# Patient Record
Sex: MALE | Race: WHITE | Employment: FULL TIME | URBAN - METROPOLITAN AREA
[De-identification: names, ages, dates, MRNs, and addresses within clinical notes are randomized per-mention and may not be internally consistent; named-entity substitution may affect disease eponyms.]

---
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
## 2019-01-12 ENCOUNTER — HOSPITAL ENCOUNTER (EMERGENCY)
Facility: CLINIC | Age: 19
Discharge: HOME | End: 2019-01-12
Attending: EMERGENCY MEDICINE | Admitting: EMERGENCY MEDICINE

## 2019-01-12 ENCOUNTER — APPOINTMENT (OUTPATIENT)
Dept: GENERAL RADIOLOGY | Facility: CLINIC | Age: 19
End: 2019-01-12

## 2019-01-12 VITALS
OXYGEN SATURATION: 99 % | DIASTOLIC BLOOD PRESSURE: 49 MMHG | TEMPERATURE: 98.6 F | RESPIRATION RATE: 18 BRPM | WEIGHT: 160 LBS | HEART RATE: 88 BPM | HEIGHT: 71 IN | SYSTOLIC BLOOD PRESSURE: 139 MMHG | BODY MASS INDEX: 22.4 KG/M2

## 2019-01-12 DIAGNOSIS — K59.00 CONSTIPATION, UNSPECIFIED CONSTIPATION TYPE: ICD-10-CM

## 2019-01-12 DIAGNOSIS — M94.0 COSTOCHONDRITIS: ICD-10-CM

## 2019-01-12 LAB
ALBUMIN SERPL-MCNC: 3.9 G/DL (ref 3.4–5)
ALBUMIN UR-MCNC: 10 MG/DL
ALP SERPL-CCNC: 96 U/L (ref 65–260)
ALT SERPL W P-5'-P-CCNC: 20 U/L (ref 0–50)
AMORPH CRY #/AREA URNS HPF: ABNORMAL /HPF
ANION GAP SERPL CALCULATED.3IONS-SCNC: 3 MMOL/L (ref 3–14)
APPEARANCE UR: ABNORMAL
AST SERPL W P-5'-P-CCNC: 15 U/L (ref 0–35)
BASOPHILS # BLD AUTO: 0 10E9/L (ref 0–0.2)
BASOPHILS NFR BLD AUTO: 0.5 %
BILIRUB SERPL-MCNC: 0.6 MG/DL (ref 0.2–1.3)
BILIRUB UR QL STRIP: NEGATIVE
BUN SERPL-MCNC: 13 MG/DL (ref 7–30)
CALCIUM SERPL-MCNC: 9.2 MG/DL (ref 8.5–10.1)
CHLORIDE SERPL-SCNC: 107 MMOL/L (ref 98–110)
CO2 SERPL-SCNC: 33 MMOL/L (ref 20–32)
COLOR UR AUTO: YELLOW
CREAT SERPL-MCNC: 0.92 MG/DL (ref 0.5–1)
DIFFERENTIAL METHOD BLD: NORMAL
EOSINOPHIL # BLD AUTO: 0.1 10E9/L (ref 0–0.7)
EOSINOPHIL NFR BLD AUTO: 0.9 %
ERYTHROCYTE [DISTWIDTH] IN BLOOD BY AUTOMATED COUNT: 12.9 % (ref 10–15)
GFR SERPL CREATININE-BSD FRML MDRD: >90 ML/MIN/{1.73_M2}
GLUCOSE SERPL-MCNC: 71 MG/DL (ref 70–99)
GLUCOSE UR STRIP-MCNC: NEGATIVE MG/DL
HCT VFR BLD AUTO: 45.3 % (ref 40–53)
HGB BLD-MCNC: 15.8 G/DL (ref 13.3–17.7)
HGB UR QL STRIP: NEGATIVE
IMM GRANULOCYTES # BLD: 0 10E9/L (ref 0–0.4)
IMM GRANULOCYTES NFR BLD: 0.2 %
KETONES UR STRIP-MCNC: NEGATIVE MG/DL
LEUKOCYTE ESTERASE UR QL STRIP: NEGATIVE
LIPASE SERPL-CCNC: 95 U/L (ref 73–393)
LYMPHOCYTES # BLD AUTO: 1.6 10E9/L (ref 0.8–5.3)
LYMPHOCYTES NFR BLD AUTO: 29.8 %
MCH RBC QN AUTO: 30.4 PG (ref 26.5–33)
MCHC RBC AUTO-ENTMCNC: 34.9 G/DL (ref 31.5–36.5)
MCV RBC AUTO: 87 FL (ref 78–100)
MONOCYTES # BLD AUTO: 0.5 10E9/L (ref 0–1.3)
MONOCYTES NFR BLD AUTO: 8.2 %
NEUTROPHILS # BLD AUTO: 3.3 10E9/L (ref 1.6–8.3)
NEUTROPHILS NFR BLD AUTO: 60.4 %
NITRATE UR QL: NEGATIVE
NRBC # BLD AUTO: 0 10*3/UL
NRBC BLD AUTO-RTO: 0 /100
PH UR STRIP: 8 PH (ref 5–7)
PLATELET # BLD AUTO: 201 10E9/L (ref 150–450)
POTASSIUM SERPL-SCNC: 4.2 MMOL/L (ref 3.4–5.3)
PROT SERPL-MCNC: 8 G/DL (ref 6.8–8.8)
RBC # BLD AUTO: 5.19 10E12/L (ref 4.4–5.9)
RBC #/AREA URNS AUTO: 0 /HPF (ref 0–2)
SODIUM SERPL-SCNC: 143 MMOL/L (ref 133–144)
SOURCE: ABNORMAL
SP GR UR STRIP: 1.01 (ref 1–1.03)
UROBILINOGEN UR STRIP-MCNC: NORMAL MG/DL (ref 0–2)
WBC # BLD AUTO: 5.5 10E9/L (ref 4–11)
WBC #/AREA URNS AUTO: 0 /HPF (ref 0–5)

## 2019-01-12 PROCEDURE — 81001 URINALYSIS AUTO W/SCOPE: CPT | Performed by: EMERGENCY MEDICINE

## 2019-01-12 PROCEDURE — 25000132 ZZH RX MED GY IP 250 OP 250 PS 637: Performed by: EMERGENCY MEDICINE

## 2019-01-12 PROCEDURE — 80053 COMPREHEN METABOLIC PANEL: CPT | Performed by: EMERGENCY MEDICINE

## 2019-01-12 PROCEDURE — 99284 EMERGENCY DEPT VISIT MOD MDM: CPT | Mod: 25

## 2019-01-12 PROCEDURE — 83690 ASSAY OF LIPASE: CPT | Performed by: EMERGENCY MEDICINE

## 2019-01-12 PROCEDURE — 71046 X-RAY EXAM CHEST 2 VIEWS: CPT

## 2019-01-12 PROCEDURE — 85025 COMPLETE CBC W/AUTO DIFF WBC: CPT | Performed by: EMERGENCY MEDICINE

## 2019-01-12 RX ORDER — LIDOCAINE 50 MG/G
1 PATCH TOPICAL EVERY 24 HOURS
Qty: 30 PATCH | Refills: 0 | Status: SHIPPED | OUTPATIENT
Start: 2019-01-12 | End: 2019-02-11

## 2019-01-12 RX ORDER — IBUPROFEN 600 MG/1
600 TABLET, FILM COATED ORAL ONCE
Status: COMPLETED | OUTPATIENT
Start: 2019-01-12 | End: 2019-01-12

## 2019-01-12 RX ADMIN — IBUPROFEN 600 MG: 600 TABLET, FILM COATED ORAL at 14:59

## 2019-01-12 ASSESSMENT — MIFFLIN-ST. JEOR: SCORE: 1762.89

## 2019-01-12 ASSESSMENT — ENCOUNTER SYMPTOMS
DYSURIA: 0
NAUSEA: 0
FEVER: 0
CONSTIPATION: 1
FREQUENCY: 0
VOMITING: 0
ABDOMINAL PAIN: 1
HEMATURIA: 0
FLANK PAIN: 1
SHORTNESS OF BREATH: 1

## 2019-01-12 NOTE — ED AVS SNAPSHOT
Emergency Department  [address] [facility] [address]  Phone:  [phone]  Fax:  [phone]                                    Jordon Bernardo   MRN: [id number]    Department:   Emergency Department   Date of Visit:  1/12/2019           After Visit Summary Signature Page    I have received my discharge instructions, and my questions have been answered. I have discussed any challenges I see with this plan with the nurse or doctor.    ..........................................................................................................................................  Patient/Patient Representative Signature      ..........................................................................................................................................  Patient Representative Print Name and Relationship to Patient    ..................................................               ................................................  Date                                   Time    ..........................................................................................................................................  Reviewed by Signature/Title    ...................................................              ..............................................  Date                                               Time          22EPIC Rev 08/18

## 2019-01-12 NOTE — ED PROVIDER NOTES
History     Chief Complaint:  Flank Pain     HPI   Jordon Bernardo is a 18 year old male with a history of HIV who presents accompanied by his mother for evaluation of flank pain. Approximately four days ago, the patient started to develop shooting pain in his right flank and right lower chest that is worse with movement. Today, his pain became more severe, prompting him to seek evaluation in the ED. Currently in the ED, the patient rates his pain at a severity of 6/10, and he has not taken any medications to manage his pain. The patient also reports a several month history of lower abdominal pain which was attributed to constipation, and he reports that he has been having abnormally small bowel movements since then, and his last bowel movement was this morning. He also reports intermittent episodes of shortness of breath that occur more frequently at work. Otherwise, he denies any recent fever, central chest pain, nausea, vomiting, bloody stools, dysuria, hematuria, urinary frequency, or rash. He has not had any recent falls or injuries. He reports that his viral load has been undetectable and he has been taking his HIV medications as prescribed.     Allergies:  NKDA      Medications:    bictegravir-emtricitabine-tenofovir (BIKTARVY) -25 MG per tablet  Amphetamine-Dextroamphetamine (ADDERALL PO)  ARIPiprazole (ABILIFY) 5 MG tablet  Fexofenadine HCl (ALLEGRA ALLERGY CHILDRENS PO)  FLUoxetine (PROZAC) 10 MG tablet  fluticasone (FLONASE) 50 MCG/ACT nasal spray  guanFACINE (TENEX) 1 MG tablet  PROPRANOLOL HCL PO  sertraline (ZOLOFT) 20 MG/ML concentrated solution  STRATTERA 18 MG capsule  traZODone (DESYREL) 150 MG tablet  Vilazodone HCl (VIIBRYD PO)    Past Medical History:    ADHD   Asymptomatic HIV   Scrotal varices   OCD     Past Surgical History:    Circumcision infant     Family History:    Heart disease - Mother     Social History:  Tobacco use:    Negative   Alcohol use:    Negative   Marital status:  "   Single   Accompanied to ED by:  Mother      Review of Systems   Constitutional: Negative for fever.   Respiratory: Positive for shortness of breath.    Cardiovascular: Positive for chest pain.   Gastrointestinal: Positive for abdominal pain and constipation. Negative for nausea and vomiting.   Genitourinary: Positive for flank pain (right). Negative for dysuria, frequency and hematuria.   All other systems reviewed and are negative.      Physical Exam   First Vitals:  BP: 139/49  Pulse: 88  Temp: 98.6  F (37  C)  Resp: 18  Height: 180.3 cm (5' 11\")  Weight: 72.6 kg (160 lb)  SpO2: 99 %    Physical Exam  General: Alert and cooperative with exam. Patient in mild distress. Normal mentation. Anxious.   Head:  Scalp is NC/AT  Eyes:  No scleral icterus, PERRL  ENT:  The external nose and ears are normal. The oropharynx is normal and without erythema; mucus membranes are moist. Uvula midline, no evidence of deep space infection.  Neck:  Normal range of motion without rigidity.  CV:  Regular rate and rhythm    No pathologic murmur   Resp:  Breath sounds are clear bilaterally    Non-labored, no retractions or accessory muscle use  GI:  Abdomen is soft, no distension. Right upper quadrant tenderness. Negative sorensen's sign. No peritoneal signs  MS:  No lower extremity edema     Tenderness to light palpation over right lateral inferior ribs with no overlying skin change.   Skin:  Warm and dry, No rash or lesions noted.  Neuro: Oriented x 3. No gross motor deficits.    Emergency Department Course     Imaging:  Radiographic findings were communicated with the patient and family who voiced understanding of the findings.    XR Chest:  IMPRESSION:  No acute pulmonary disease.  Preliminary report per radiology.     Laboratory:  CBC: WNL (WBC 5.5, HGB 15.8, )  CMP: Carbon dioxide 33 high, o/w WNL (Creatinine 0.92)  Lipase: 95   UA with Microscopic: pH 8.0 high, Protein total 10, Few amorphous crystals, o/w Negative "     Interventions:  1459 Ibuprofen 600 mg PO     Emergency Department Course:  Nursing notes and vitals reviewed.  1439: I performed an exam of the patient as documented above.     1536: I updated and reassessed the patient.     Findings and plan explained to the Patient and mother. Patient discharged home with instructions regarding supportive care, medications, and reasons to return. The importance of close follow-up was reviewed. The patient was prescribed Lidocaine patches.      Impression & Plan      Medical Decision Making:  Jordon Bernardo is a 18 year old male presents with complaint of chest wall/rib pain.  There has been no trauma.  Pain is exactly reproducible with palpation.  CXR was obtained and showed No acute process. This does not appear to be ACS or PE as his symptom complex is not compatible with either.  he has had some improvement with treatment here. He is advised to take Ibuprofen and use ice or heat to relieve pain.  He will follow up with PCP in 3-5 days if no improvement or sooner if worsening.  He will return to the ED if he develops difficulty breathing, high fever(not controlled with medications), central chest pain or for other concerns. Recommended miralax for constipation.    Diagnosis:    ICD-10-CM    1. Costochondritis M94.0    2. Constipation, unspecified constipation type K59.00        Disposition:  Discharged to home.    Discharge Medications:     Medication List      Started    lidocaine 5 % patch  Commonly known as:  LIDODERM  1 patch, Transdermal, EVERY 24 HOURS            Elton ROGERS, am serving as a scribe at 2:39 PM on 1/12/2019 to document services personally performed by Josh Rosales DO based on my observations and the provider's statements to me.     EMERGENCY DEPARTMENT       Josh Rosales DO  01/13/19 1148

## 2019-01-12 NOTE — DISCHARGE INSTRUCTIONS
Return to the emergency department or seek medical care as instructed if your symptoms fail to improve or significantly worsen.    Take ibuprofen (aka Motrin/Advil, 600mg up to 4 times per day with food) as needed for symptom/pain relief; use as directed.    Ice area of pain for 20 minutes four times per day for the next two days    Recommend MiraLAX as needed for constipation    May use lidocaine patches for additional chest wall pain relief (do not use in conjunction with heating pads)    Follow-up as indicated on page 1.  Maintain adequate hydration and get plenty of rest.

## 2019-01-12 NOTE — LETTER
January 12, 2019      To Whom It May Concern:      Jordon Bernardo was seen in our Emergency Department today, 01/12/19.  I expect his condition to improve over the next 1-2 days.  He may return to work when improved.    Sincerely,        Rajat Cruz RN

## 2022-09-09 ENCOUNTER — NURSE TRIAGE (OUTPATIENT)
Dept: NURSING | Facility: CLINIC | Age: 22
End: 2022-09-09

## 2022-09-10 NOTE — TELEPHONE ENCOUNTER
Nurse Triage SBAR    Situation: STD exposure    Background: Patient calling. Yesterday had a sexual interaction with another male.     Assessment: Patient noticed that his partner may have had a sexually transmitted disease. Sores noted on his partner after intercourse. Urinated and showered after. No sores, no pain, no rash noted. Denied fever      Protocol Recommended Disposition: Emergency Department    Recommendation: According to the protocol, Patient should go to the ED now. Advised Patient to go to the ED now. Care advice given. Patient verbalizes understanding and agrees with plan of care. Reviewed concerning symptoms and when to call back.       Lisa Vallejo, RN Nursing Advisor 9/9/2022 9:39 PM     Reason for Disposition    Patient wants to be seen    Hepatitis A exposure, questions about    Symptoms of a Sexually Transmitted Disease or Infection (STD, STI)    Additional Information    Negative: Rash or sores on penis or scrotum    Negative: Rash or sores on female genital area (vulvar area)    Negative: Forced to have sex (sexual assault or rape) in the past 7 days    Negative: Female and EITHER of the following: * Constant lower abdominal pain lasting more than 2 hours* Unable to urinate (or only a few drops) and bladder feels very full    Negative: Sexual intercourse (in the past 72 hours) with someone who was diagnosed with HIV    Negative: Male and EITHER of the following: * Fever and testicle pain or swelling* Unable to urinate (or only a few drops) and bladder feels very full    Negative: Fever and burning (pain) with urination    Negative: Forced to have sex (sexual assault or rape) > 7 days ago    Negative: Female and ANY of the following: * Burning (pain) with urination * Unexplained lower abdominal pain * Abnormal color of vaginal discharge (i.e., yellow, green, gray) * Bad-smelling vaginal discharge    Negative: Rectal discharge or unusual rectal pain or itching    Negative: Male with ANY of the  following: * Burning (pain) with urination * Pus (white, yellow) or bloody discharge from end of penis* Testicle pain or swelling    Questions about Sexually Transmitted Disease or Infection (STD, STI), but no symptoms    Protocols used: STD EXPOSURE AND PREVENTION-A-OH, STI EXPOSURE-A-, STD [id number], STDS - GUIDELINE [id number]
